# Patient Record
(demographics unavailable — no encounter records)

---

## 2024-10-22 NOTE — HISTORY OF PRESENT ILLNESS
[de-identified] : DOMENICO SINCLAIR is a 39 year old male with a PMH of Hepatitis C s/p Mavyret, Hepatic Steatosis, Beta Thalassemia Minor, preDM and obesity.   He presents today for evaluation of elevated LFTs. Records reviewed, including notes, labs, imaging, etc. LFT elevations date back to at least 2021.  Labs 5/17/24 - tbili 1.6, AST 75, ALT 74, AP 60. CLD work up - HCVab+ s/p Mavyret w/SVR, HBV negative. SMA weakly+, FALGUNI -, AMA -. HH gene mutation negative. A1AT and ceruloplasmin. CT A/P w/wo IVC 6/2023 - hepatosplenomegaly, hepatic steatosis, no focal hepatic abnormality   Denies recent infection, abdominal pain or distension, jaundice, hematemesis, hematochezia, dark urine, confusion or unintentional weight loss. Reports history of excessive alcohol consumption about 2-3 beers QOD, now having 2 drinks per week for the last 2 months. Denies otc supplements. Started Ozempic 2 months ago for preDM.

## 2024-10-22 NOTE — ADDENDUM
[FreeTextEntry1] : I, Elizabeth Doherty NP, acted as scribe for JANIE Hanson for this patient encounter.

## 2024-10-22 NOTE — ASSESSMENT
[FreeTextEntry1] : DOMENICO SINCLAIR is a 39 year old male with a PMH of Hepatitis C s/p Mavyret, Hepatic Steatosis, Beta Thalassemia Minor, preDM and obesity.  Elevated LFTs -Labs 5/17/24 - tbili 1.6, AST 75, ALT 74, AP 60. CLD work up - HCVab+ s/p Mavyret w/SVR, HBV negative. SMA weakly+, FALGUNI -, AMA -. HH gene mutation negative. A1AT and ceruloplasmin. CT A/P w/wo IVC 6/2023 - hepatosplenomegaly, hepatic steatosis, no focal hepatic abnormality -likely QUENTIN -advised abstaining from alcohol -Labs ordered to rule out competing etiologies of liver disease  Hepatic Steatosis -Etiology of metabolic associated steatotic liver disease explained to pt in detail along with complications of disease progression. -Recommend lifestyle modifications in the form of diet and exercise. Gradual weight loss of 7-10% of current body weight. These changes have been shown to lead to regression or even resolution of steatosis, inflammation, and even fibrosis in some patients. -Copy of Mediterranean diet given. -will consider FibroScan at next visit to r/o fibrosis -If LFTs remain elevated despite lifestyle modifications, will consider Liver Biopsy.   Follow up in 3 months w/labs

## 2024-11-01 NOTE — ASSESSMENT
[FreeTextEntry1] : Asthma: well controlled, c/w albuterol hfa prn for sob/wheezing, advised to go to ER if condition is not resolved with hfa, refill fluticasone-salmeterol 250-50 mcg/act 1 puff bid HLD: Lipid profile 12/23 reviewed, Recommend low fat diet, wt loss, exercise, nutritional counseling provided, f/u lipid panel ordered T2DM: Recommend low carb diet, wt loss, exercise, f/u a1c ordered, a1c 5/24 reviewed, recommend annual ophthalmology eval RTC 4 wks

## 2024-11-01 NOTE — HISTORY OF PRESENT ILLNESS
[FreeTextEntry1] : medication and bw follow up [de-identified] : 38 yo male presents for follow up asthma, HLD, T2DM. Reports feeling well. Denies fever, chills, cp, palpitations, sob, nvcd.

## 2025-01-22 NOTE — HISTORY OF PRESENT ILLNESS
[Home] : at home, [unfilled] , at the time of the visit. [Medical Office: ([facility])___] : at the medical office located in  [Verbal consent obtained from patient] : the patient, [unfilled] [FreeTextEntry1] : anxiety [de-identified] : 40 yo male presents with complaint of situation anxiety with mri. He has 2 upcoming MRI's coming up and is requesting medication. Denies fever, chills, cp, palpitations, sob, nvcd.

## 2025-01-22 NOTE — ASSESSMENT
[FreeTextEntry1] : Situational anxiety: start alprazolam 0.25 mg po prn prior to mri, take 20 - 30 min prior to procedure, take care to avoid falls, take once seated RTC 4 wks

## 2025-01-22 NOTE — END OF VISIT
[facility]  RHEUMATOLOGY  NOTE    CHIEF COMPLAINT:    Chief Complaint   Patient presents with   • Follow-up     Problem list:  1. Diagnosis of polymyositis in 2001: Treated with prednisone  2. 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 Ongoing problems with calcinosis cutis: But reluctant for medications   Echocardiogram and pulmonary function test with no abnormality in DLCO or pulmonary hypertension in 2017, patient not interested in doing repeat testing  HISTORY OF PRESENT ILLNESS:    Dana Mills is a very nice 74 year old female here to see me for follow-up on overlap syndrome. She is not currently on medications from rheumatology. Her fingertips are tender from the calcinosis. She does conservative regimens for the same, including the use of finger sleeves. Otherwise, she tells me she has been stable. The raynaud's is controlled. No significant change in muscle strength. No obvious joint swelling.    She denies having chest pain, palpitations, fever cough, or difficulty breathing.     RECAP:  She was diagnosed with polymyositis in 2001, and was treated with prednisone. Later on, a diagnosis of limited scleroderma was also made based on the calcinosis, Raynauds. They're not seems to be under control, and the polymyositis remained in remission. Denies having any changes and has no significant joint pains, or muscle weakness that has happened lately. She did lose them muscle strength when that episode happened but that has been stable since then.    The calcinosis continues to bother her but she tells me that she doesn't really like taking medications. She is not on hydroxychloroquine anymore because of the constipation. She does take meloxicam on as-needed basis to be used as an anti-inflammatory. She has lots to live with the calcinosis for now.    PROBLEM LIST:    Patient Active Problem List   Diagnosis   • Essential hypertension, benign   •  Unspecified diffuse connective tissue disease   • Cervicalgia   • Acquired hypothyroidism   • Other hyperlipidemia   • Colon cancer screening   • Diverticulosis of colon     HISTORIES:   Allergies, Medications, Medical history, Surgical history, Social history and Family history were reviewed and updated.    REVIEW OF SYSTEMS:    Review of systems:   CONSTITUTIONAL:  Patient denies fevers, chills, sweats and weight changes.   EYES:  Patient denies any visual symptoms, including redness, pain, and blurred vision. No history of uvetis or scleritis.  EARS, NOSE AND THROAT:  No difficulties with hearing.  No symptoms of rhinitis or sore throat. No chronic sinusitis. No sores in nose or mouth.  CARDIOVASCULAR:  Patient denies chest pains, palpitations, orthopnea and paroxysmal nocturnal dyspnea.   RESPIRATORY:  No dyspnea on exertion, no wheezing or cough.   GASTROINTESTINAL:  No nausea, vomiting, diarrhea, constipation, abdominal pain, hematochezia or melena.   GENITOURINARY:  No urinary hesitancy or dribbling.  No nocturia or urinary frequency. No abnormal urethral discharge. No change in color or appearance of urine.  NEUROLOGIC:  No chronic headaches, no seizures.  Patient denies numbness, tingling or weakness.   PSYCHIATRIC:  Patient denies problems with mood disturbance.  No problems with anxiety or depression.   ENDOCRINE:  No excessive urination or excessive thirst.   DERMATOLOGIC:  As above     PHYSICAL EXAMINATION:  Vital Signs:    Visit Vitals  /70   Pulse 80   LMP  (LMP Unknown)     General:  Alert, awake, oriented x3, not in acute distress.  Pleasant and appears stated age.  Skin:  No palpable purpura, psoriasis or livedo on the upper and lower extremities. Calcinosis present on bilateral arms, some of them draining, also on the feet. Also, on multiple fingers.  HEENT:  Pink palpebral conjunctivae, anicteric sclerae.  No malar rash, oronasal ulcerations or parotid gland enlargement with moist buccal  [Time Spent: ___ minutes] : I have spent [unfilled] minutes of time on the encounter which excludes teaching and separately reported services. mucosa.  No heliotrope rash.  No scalp tenderness.  Neck:  Supple, no thyromegaly or lymphadenopathy with no palpable mass.  Lungs:  No rales or wheezing. Clear to auscultation.  Heart:  No gallop. S1 and S2 are heard without murmur.  Extremities:  No bipedal edema, cyanosis or clubbing with palpable pedal and posterior tibial pulses.  No sclerodactyly, periungual erythema, nail pitting, subcutaneous nodules, fat pad atrophy or digital ulcerations.  No Gottron's papule or telangiectasia.  No bilateral puffy fingers. No periungual nailfold changes.  No axillary or inguinal lymphadenopathy.  Neurological:  No focal muscle weakness or sensory deficits with 2+ deep tendon reflexes on the upper and lower extremities.  Musculoskeletal:    Extensive calcinosis in bilateral hands and left elbow.  All distal interphalangeals, proximal interphalangeals and metacarpophalangeals with good range of motion without synovial thickening.  Wrists and elbows with good range of motion without swelling.  Shoulders with good range of motion without pain on motion.  Hips with good range of motion without pain on motion.  Knees are cool without effusion.  Normal range of motion.  Ankles are nontender without swelling.  Normal range of motion.  Metatarsals nontender to palpation bilaterally.  Neck with good range of motion.    LABORATORY DATA:     CBC:   Lab Results   Component Value Date    WBC 7.0 01/28/2020    HCT 42.2 01/28/2020    HGB 13.6 01/28/2020     01/28/2020     Lab Results   Component Value Date    ALBUMIN 3.9 01/28/2020    ALBUMIN 4.0 11/13/2018    ALBUMIN 4.2 06/26/2018     Creatinine (mg/dL)   Date Value   01/28/2020 0.67   03/08/2019 0.75   11/13/2018 0.65     No results found for: RESR    No results found for: CRP  VITAMIND, 25 HYDROXY (ng/mL)   Date Value   11/13/2018 23.6 (L)     Vitamin D, 25-Hydroxy (ng/mL)   Date Value   01/21/2021 33.8       IMAGING STUDIES:    No orders to display       ASSESSMENT:    1.  Overlap syndrome (CMS/HCC)    2. Calcinosis cutis        PLAN:   Orders Placed This Encounter   • Comprehensive Metabolic Panel   • CBC with Automated Differential   • Creatine Kinase   • Aldolase     >No obvious synovitis. Her muscle strength is stable. The raynaud's is controlled. She is clinically stable at this time. She will remain off medications for now. She will continue with conservative regimens for the calcinosis.     >Continue monitoring labs.    >I recommended she do a lung function and echocardiogram on a yearly or semi-yearly basis because of her scleroderma. She deferred at this time, will call if she changes her mind.    >Her last bone density scan was in November 2020, ordered by her PCP and showed osteopenia. She will take adequate calcium and vitamin D through her diet.     No follow-ups on file.    Instructions provided as documented in the After Visit Summary.    The patient indicated understanding of the diagnosis and agreed with the plan of care.    On 1/28/2021, INarcisa scribed the services personally performed by Robby Skelton MD.    The documentation recorded by the scribe accurately and completely reflects the service(s) I personally performed and the decisions made by me.

## 2025-02-04 NOTE — HEALTH RISK ASSESSMENT
[Good] : ~his/her~  mood as  good [Yes] : Yes [Monthly or less (1 pt)] : Monthly or less (1 point) [1 or 2 (0 pts)] : 1 or 2 (0 points) [Never (0 pts)] : Never (0 points) [No] : In the past 12 months have you used drugs other than those required for medical reasons? No [0] : 2) Feeling down, depressed, or hopeless: Not at all (0) [PHQ-2 Negative - No further assessment needed] : PHQ-2 Negative - No further assessment needed [Audit-CScore] : 1 [de-identified] : improving [de-identified] : improving [[id number]] : 0 [Never] : Never [HIV Test offered] : HIV Test offered [Hepatitis C test offered] : Hepatitis C test offered [With Family] : lives with family [Employed] : employed [] :  [Sexually Active] : not sexually active [High Risk Behavior] : no high risk behavior [Feels Safe at Home] : Feels safe at home [Fully functional (bathing, dressing, toileting, transferring, walking, feeding)] : Fully functional (bathing, dressing, toileting, transferring, walking, feeding) [Fully functional (using the telephone, shopping, preparing meals, housekeeping, doing laundry, using] : Fully functional and needs no help or supervision to perform IADLs (using the telephone, shopping, preparing meals, housekeeping, doing laundry, using transportation, managing medications and managing finances) [Reports changes in hearing] : Reports no changes in hearing [Reports changes in vision] : Reports no changes in vision [Reports changes in dental health] : Reports no changes in dental health

## 2025-02-04 NOTE — ASSESSMENT
[FreeTextEntry1] : Annual physical: f/u routine labwork Beta thalassemia minor/abnormal RBC morphology/schistocytes: f/u hematology T2DM: c/w ozempic as prescried, Recommend low carb diet, wt loss, exercise, f/u a1c ordered, f/u ophthalmology eval Obesity: Recommend low fat diet, wt loss, exercise, and DASH diet. Screen for STDs: f/u labwork Hyperbilirubinemia: fu/ repeat bilirubin level  Hepatitis C AB positive: s/p mayvent, f/u GI Elevated AFP: f/u GI Bladder wall thickening: f/u urology Fatty liver: Recommend low fat diet, wt loss, exercise, and DASH diet, avoid excess alcohol/tylenol, f/u LFTs, f/u Dr. Valerio, hepatology Asthma: well controlled, c/w albuterol hfa prn for sob/wheezing, advised to go to ER if condition is not resolved with hfa B12 def: f/u b12 level ordered GERD: improved, avoid late night meals/snacks (2 - 3 hours before bedtime), avoid smoking, avoid tight clothing especially around stomach area, avoid foods that worsen symptoms which can include coffee, chocolate, alcohol, peppermint, and fatty foods. Pre-employment exam: f/u labwork RTC 3 wks

## 2025-02-04 NOTE — HISTORY OF PRESENT ILLNESS
[FreeTextEntry1] : CPE [de-identified] : 38 yo male presents for annual physical. Reports feeling well. Denies fever, chills, cp, palpitations, sob, nvcd.